# Patient Record
(demographics unavailable — no encounter records)

---

## 2024-12-26 NOTE — PHYSICAL EXAM
[No Acute Distress] : no acute distress [Well Nourished] : well nourished [Well Developed] : well developed [Well-Appearing] : well-appearing [Normal Sclera/Conjunctiva] : normal sclera/conjunctiva [PERRL] : pupils equal round and reactive to light [EOMI] : extraocular movements intact [Normal Outer Ear/Nose] : the outer ears and nose were normal in appearance [Normal Oropharynx] : the oropharynx was normal [No Lymphadenopathy] : no lymphadenopathy [Supple] : supple [Thyroid Normal, No Nodules] : the thyroid was normal and there were no nodules present [No Respiratory Distress] : no respiratory distress  [No Accessory Muscle Use] : no accessory muscle use [Clear to Auscultation] : lungs were clear to auscultation bilaterally [Normal Rate] : normal rate  [Regular Rhythm] : with a regular rhythm [Normal S1, S2] : normal S1 and S2 [Pedal Pulses Present] : the pedal pulses are present [Soft] : abdomen soft [Non Tender] : non-tender [Non-distended] : non-distended [Normal Bowel Sounds] : normal bowel sounds [No CVA Tenderness] : no CVA  tenderness [No Spinal Tenderness] : no spinal tenderness [No Joint Swelling] : no joint swelling [Grossly Normal Strength/Tone] : grossly normal strength/tone [No Focal Deficits] : no focal deficits [Normal Gait] : normal gait [Alert and Oriented x3] : oriented to person, place, and time

## 2024-12-26 NOTE — HISTORY OF PRESENT ILLNESS
[FreeTextEntry1] : Follow up, pre-employment.  [de-identified] : 50 yo F w/ PMH pre-DM, Elevated BP (not on any medication), migraine headache, Ho Hep B infection (~17yrs ago) s/p TTX and Bell's Palsy presenting for follow-up. No acute complaints.  Pt endorses having a diet rich in rice, meat, tortillas, poor vegetable intake. Colonoscopy in November cancelled as pt not feeling well. Rescheduled but unable to remember when.  Prior visit [facility] encouraged lifestyle modifications for elevated BP. Pt unable to find time for exercise but states she will try to incorporate more exercise in day-to-day life. Wants to try lifestyle modification before initiating pharmacotherapy.   Endorses minimal relief with migraine pharmacotherapy (6 months), requesting further evaluation. Migraines exacerbated in cold weather. Bitemporal region radiating to top of head, at times behind ears. Can start behind ears and travel down neck. Severity 3-4/10. Every day in the week, occurring usually once a day (can be at noon, night). No pre-aura symptoms. During headaches can have blurry vision bilaterally. Relieved with massage. Frequency unchanged from prior.

## 2024-12-26 NOTE — PLAN
[FreeTextEntry1] : #Pre-DM #DLD - 9/2024: HgB A1C: 5.7% - 12/2024: Total Cholesterol 198 -> 222,  -> 141 - 12/2024 A1C 5.5 -Pt declined statin, would like to try lifestyle interventions before initiation of medication  #HTN -, repeat 147/88 -Patient declined pharmacotherapy, would like to try lifestyle interventions before initiation of medication -counselled on diet and exercise; [place] when weather [place] to ensure daily exercise  #Vitamin D deficiency -12/23/24 18 -Vitamin D supplementation prescribed, 2000 U/day  #Hx of Hep B infection - Hep B viral panel WNL  # Migranes - c/w Topiramate and Meloxicam (renew today) - sumatriptan PO PRN for breakthrough headaches -Neurology referral  #HCM - Mammogram 5/2023: Negative, BIRADS 1; repeat yearly due 5/24 - pt stated she is going to see her gynecologist in 04/2025 - PAP 6/2023: will follow with GYN 04/2025 - Colonoscopy: rescheduled, GI follow up 03/2025 - Flu vaccine received - RTC 1 week to complete pre-employment form once Quantiferon results